# Patient Record
Sex: MALE | Race: WHITE | URBAN - METROPOLITAN AREA
[De-identification: names, ages, dates, MRNs, and addresses within clinical notes are randomized per-mention and may not be internally consistent; named-entity substitution may affect disease eponyms.]

---

## 2018-08-02 ENCOUNTER — OFFICE VISIT (OUTPATIENT)
Dept: FAMILY MEDICINE CLINIC | Age: 48
End: 2018-08-02

## 2018-08-02 VITALS
RESPIRATION RATE: 14 BRPM | DIASTOLIC BLOOD PRESSURE: 96 MMHG | BODY MASS INDEX: 28.58 KG/M2 | OXYGEN SATURATION: 98 % | TEMPERATURE: 97.5 F | WEIGHT: 193 LBS | SYSTOLIC BLOOD PRESSURE: 148 MMHG | HEIGHT: 69 IN | HEART RATE: 85 BPM

## 2018-08-02 DIAGNOSIS — F41.9 ANXIETY AND DEPRESSION: ICD-10-CM

## 2018-08-02 DIAGNOSIS — F32.A ANXIETY AND DEPRESSION: ICD-10-CM

## 2018-08-02 DIAGNOSIS — I10 ESSENTIAL HYPERTENSION: Primary | ICD-10-CM

## 2018-08-02 DIAGNOSIS — F51.01 PRIMARY INSOMNIA: ICD-10-CM

## 2018-08-02 RX ORDER — ZOLPIDEM TARTRATE 10 MG/1
10 TABLET ORAL
Qty: 30 TAB | Refills: 0
Start: 2018-08-02 | End: 2018-08-30 | Stop reason: DRUGHIGH

## 2018-08-02 RX ORDER — LISINOPRIL 10 MG/1
10 TABLET ORAL DAILY
Qty: 30 TAB | Refills: 3
Start: 2018-08-02

## 2018-08-02 RX ORDER — IBUPROFEN 200 MG
800 TABLET ORAL
COMMUNITY

## 2018-08-02 RX ORDER — LISINOPRIL 10 MG/1
TABLET ORAL DAILY
COMMUNITY
End: 2018-08-02 | Stop reason: SDUPTHER

## 2018-08-02 NOTE — PROGRESS NOTES
Discharge instructions reviewed with patient     Medication list and understanding of medications reviewed with patient. OTC and herbal medications reviewed and added to med list if applicable  Barriers to adherence assessed. Follow up as needed.

## 2018-08-02 NOTE — PROGRESS NOTES
HPI  Callie Calvin is a 51 y.o. male being seen today for   Chief Complaint   Patient presents with    Sleep Problem     \"pt stated that he have not slept in 4 days pt stated that his father pass away\"    Medication Refill   . he states that he needs refill lisinopril for blood pressure. Also needs rx for insomnia. He does have anxiety and hx of opitate and benzo addiction. Recent stress due to death in family and would like short term rx for [place]. It has worked well for him in the past    Hx of anxiety and depression. Not on antidepressant at this time and feels he does not need    Past Medical History:   Diagnosis Date    Hypertension          ROS  Patient states that he is feeling well. Denies complaints of chest pain, shortness of breath, swelling of legs, dizziness or weakness. he denies nausea, vomiting or diarrhea. Current Outpatient Prescriptions   Medication Sig    lisinopril (PRINIVIL, ZESTRIL) 10 mg tablet Take  by mouth daily.  ibuprofen (MOTRIN IB) 200 mg tablet Take 800 mg by mouth. No current facility-administered medications for this visit. PE  Visit Vitals    BP (!) 148/96 (BP 1 Location: Left arm, BP Patient Position: Sitting)    Pulse 85    Temp 97.5 °F (36.4 °C) (Temporal)    Resp 14    Ht 5' 9\" (1.753 m)    Wt 193 lb (87.5 kg)    SpO2 98%    BMI 28.5 kg/m2        Alert and oriented with normal mood and affect. he is well developed and well nourished . Lungs are clear without wheezing. Heart rate is regular without murmurs or gallops. There is no lower extremity edema. No results found for this or any previous visit. Assessment and Plan:        ICD-10-CM ICD-9-CM    1. Essential hypertension I10 401.9    2.  Primary insomnia F51.01 307.42      Refill lisinopril  Short term rx ambien  Follow up prn      Osbaldo Echavarria MD

## 2018-08-05 PROBLEM — F41.9 ANXIETY AND DEPRESSION: Status: ACTIVE | Noted: 2018-08-05

## 2018-08-05 PROBLEM — F32.A ANXIETY AND DEPRESSION: Status: ACTIVE | Noted: 2018-08-05

## 2018-08-30 ENCOUNTER — OFFICE VISIT (OUTPATIENT)
Dept: FAMILY MEDICINE CLINIC | Age: 48
End: 2018-08-30

## 2018-08-30 VITALS
TEMPERATURE: 99 F | RESPIRATION RATE: 16 BRPM | WEIGHT: 194.4 LBS | OXYGEN SATURATION: 97 % | HEIGHT: 69 IN | HEART RATE: 90 BPM | BODY MASS INDEX: 28.79 KG/M2 | SYSTOLIC BLOOD PRESSURE: 123 MMHG | DIASTOLIC BLOOD PRESSURE: 85 MMHG

## 2018-08-30 DIAGNOSIS — F51.01 PRIMARY INSOMNIA: Primary | ICD-10-CM

## 2018-08-30 DIAGNOSIS — R05.9 COUGH: ICD-10-CM

## 2018-08-30 RX ORDER — BENZONATATE 200 MG/1
200 CAPSULE ORAL
Qty: 60 CAP | Refills: 0
Start: 2018-08-30 | End: 2018-09-06

## 2018-08-30 RX ORDER — ZOLPIDEM TARTRATE 5 MG/1
5 TABLET ORAL
Qty: 30 TAB | Refills: 0
Start: 2018-08-30

## 2018-08-30 RX ORDER — PREDNISONE 10 MG/1
TABLET ORAL
Refills: 0 | COMMUNITY
Start: 2018-08-25

## 2018-08-30 NOTE — PROGRESS NOTES
Discharge instructions reviewed with patient     Medication list and understanding of medications reviewed with patient. OTC and herbal medications reviewed and added to med list if applicable  Barriers to adherence assessed.

## 2019-11-27 NOTE — PROGRESS NOTES
NAYLA Church is a 51 y.o. male being seen today for   Chief Complaint   Patient presents with    Medication Problem     Lower dose sleeping pill    Follow Up Chronic Condition     monitor and medicate HTN   . he states that he has been taking his bp med. Still struggling with insomnia but he does think he only needs a 5mg tab for 30 more days  History of opiate and benzodiazepine addiction. Also cough/bronchitis. Seen at ED and treated but does still have lingering cough. No shortness of breath    Past Medical History:   Diagnosis Date    Hypertension          ROS  Patient states that he is feeling well. Denies complaints of chest pain, shortness of breath, swelling of legs, dizziness or weakness. he denies nausea, vomiting or diarrhea. Current Outpatient Prescriptions   Medication Sig    predniSONE (DELTASONE) 10 mg tablet     zolpidem (AMBIEN) 5 mg tablet Take 1 Tab by mouth nightly as needed for Sleep. Max Daily Amount: 5 mg.  benzonatate (TESSALON) 200 mg capsule Take 1 Cap by mouth three (3) times daily as needed for Cough for up to 7 days.  lisinopril (PRINIVIL, ZESTRIL) 10 mg tablet Take 1 Tab by mouth daily.  ibuprofen (MOTRIN IB) 200 mg tablet Take 800 mg by mouth. No current facility-administered medications for this visit. PE  Visit Vitals    /85 (BP 1 Location: Right arm, BP Patient Position: Sitting)    Pulse 90    Temp 99 °F (37.2 °C) (Temporal)    Resp 16    Ht 5' 9\" (1.753 m)    Wt 194 lb 6.4 oz (88.2 kg)    SpO2 97%    BMI 28.71 kg/m2        Alert and oriented with normal mood and affect. he is well developed and well nourished . Lungs are clear without wheezing. Heart rate is regular without murmurs or gallops. There is no lower extremity edema. No results found for this or any previous visit. Assessment and Plan:        ICD-10-CM ICD-9-CM    1. Primary insomnia F51.01 307.42 zolpidem (AMBIEN) 5 mg tablet   2.  Cough R05 786.2      Refill ambien 5mg for 30 days  rx tessalon for cough      Zuleima Lundberg MD with patient